# Patient Record
Sex: MALE | Employment: FULL TIME | URBAN - METROPOLITAN AREA
[De-identification: names, ages, dates, MRNs, and addresses within clinical notes are randomized per-mention and may not be internally consistent; named-entity substitution may affect disease eponyms.]

---

## 2018-12-28 ENCOUNTER — HOSPITAL ENCOUNTER (EMERGENCY)
Facility: CLINIC | Age: 33
Discharge: HOME | End: 2018-12-28
Attending: EMERGENCY MEDICINE | Admitting: EMERGENCY MEDICINE

## 2018-12-28 VITALS
RESPIRATION RATE: 12 BRPM | HEART RATE: 75 BPM | OXYGEN SATURATION: 99 % | TEMPERATURE: 97.9 F | SYSTOLIC BLOOD PRESSURE: 112 MMHG | DIASTOLIC BLOOD PRESSURE: 80 MMHG | WEIGHT: 158 LBS

## 2018-12-28 DIAGNOSIS — L03.031 PARONYCHIA OF TOE, RIGHT: ICD-10-CM

## 2018-12-28 PROCEDURE — 99284 EMERGENCY DEPT VISIT MOD MDM: CPT | Mod: Z6 | Performed by: EMERGENCY MEDICINE

## 2018-12-28 PROCEDURE — 99282 EMERGENCY DEPT VISIT SF MDM: CPT

## 2018-12-28 RX ORDER — CEPHALEXIN 500 MG/1
500 CAPSULE ORAL 4 TIMES DAILY
Qty: 40 CAPSULE | Refills: 0 | Status: SHIPPED | OUTPATIENT
Start: 2018-12-28 | End: 2019-01-07

## 2018-12-28 SDOH — HEALTH STABILITY: MENTAL HEALTH: HOW OFTEN DO YOU HAVE A DRINK CONTAINING ALCOHOL?: NEVER

## 2018-12-28 ASSESSMENT — ENCOUNTER SYMPTOMS: COLOR CHANGE: 1

## 2018-12-28 NOTE — ED AVS SNAPSHOT
[facility], [place], Emergency Department  [address]  [place] [address]  Phone:  [phone]  Fax:  [phone]                                    Ge Messina   MRN: [id number]    Department:  [facility], Emergency Department   Date of Visit:  12/28/2018           After Visit Summary Signature Page    I have received my discharge instructions, and my questions have been answered. I have discussed any challenges I see with this plan with the nurse or doctor.    ..........................................................................................................................................  Patient/Patient Representative Signature      ..........................................................................................................................................  Patient Representative Print Name and Relationship to Patient    ..................................................               ................................................  Date                                   Time    ..........................................................................................................................................  Reviewed by Signature/Title    ...................................................              ..............................................  Date                                               Time          22EPIC Rev 08/18

## 2018-12-29 NOTE — ED PROVIDER NOTES
[facility] EMERGENCY DEPARTMENT ([facility])    12/28/18       History     Chief Complaint   Patient presents with     Foot Pain     Pt states that he thinks he has an ingrown toe nail     The history is provided by the patient.     Ge Messina is a 33 year old otherwise healthy male who presents to the Emergency Department for evaluation of swelling, redness and pain on the right great toe that the patient first noticed today around 12 PM.  The patient reports that he first noticed it starting near the nail bed and it has been worsening throughout the day.  The patient denies any trauma to the feet.  He denies wearing tight shoes.  The patient denies any history of diabetes mellitus and he denies any history of past infections.  He denies any pain going into the foot.  He denies any allergies to antibiotics.    I have reviewed the Medications, Allergies, Past Medical and Surgical History, and Social History in the Epic system.    History reviewed. No pertinent past medical history.    History reviewed. No pertinent surgical history.    History reviewed. No pertinent family history.    Social History     Tobacco Use     Smoking status: Current Some Day Smoker     Smokeless tobacco: Never Used   Substance Use Topics     Alcohol use: No     Frequency: Never     Comment: sometimes       No current facility-administered medications for this encounter.      Current Outpatient Medications   Medication     cephALEXin (KEFLEX) 500 MG capsule      No Known Allergies      Review of Systems   Skin: Positive for color change (redness right great toe).        Positive for swelling of right great toe   All other systems reviewed and are negative.      Physical Exam   BP: 117/65  Pulse: 81  Temp: 98.5  F (36.9  C)  Resp: 16  Weight: 71.7 kg (158 lb)  SpO2: 98 %      Physical Exam   Constitutional: He is oriented to person, place, and time. He appears well-developed and well-nourished. No distress.    HENT:   Head: Normocephalic and atraumatic.   Neck: Normal range of motion.   Pulmonary/Chest: Effort normal.   Neurological: He is alert and oriented to person, place, and time.   Skin: He is not diaphoretic. There is erythema (base of great toenail.  no pus noted but appears to be an early paronychia. mild swelling.  no foreign body. ).   Nursing note and vitals reviewed.      ED Course   10:59 PM  The patient was seen and examined by Delmis Jordan MD in Room ED20.        Procedures           Labs Ordered and Resulted from Time of ED Arrival Up to the Time of Departure from the ED - No data to display         Assessments & Plan (with Medical Decision Making)   Appears to have an early paronychia.  No pus to drain. No hx of foreign body or trauma.  No hx of DM or immunosuppression.  No MRSA hx.  Will treat with keflex. We discussed returning if worsening.     I have reviewed the nursing notes.    I have reviewed the findings, diagnosis, plan and need for follow up with the patient.       Medication List      Started    cephALEXin 500 MG capsule  Commonly known as:  KEFLEX  500 mg, Oral, 4 TIMES DAILY            Final diagnoses:   Paronychia of toe, right     IMehdi, am serving as a trained medical scribe to document services personally performed by Delmis Jordan MD, based on the provider's statements to me.   IDelmis MD, was physically present and have reviewed and verified the accuracy of this note documented by Mehdi Bloom.    12/28/2018   [facility], EMERGENCY DEPARTMENT     Delmis Jordan MD  12/29/18 0131

## 2018-12-29 NOTE — DISCHARGE INSTRUCTIONS
Take keflex as prescribed.     If the redness is worsening, see your doctor or return for further evaluation.

## 2023-08-25 ENCOUNTER — HOSPITAL ENCOUNTER (EMERGENCY)
Facility: CLINIC | Age: 38
Discharge: HOME | End: 2023-08-25
Attending: EMERGENCY MEDICINE | Admitting: EMERGENCY MEDICINE

## 2023-08-25 VITALS
OXYGEN SATURATION: 97 % | RESPIRATION RATE: 16 BRPM | DIASTOLIC BLOOD PRESSURE: 77 MMHG | SYSTOLIC BLOOD PRESSURE: 130 MMHG | TEMPERATURE: 98.3 F | HEART RATE: 56 BPM

## 2023-08-25 DIAGNOSIS — S61.012A THUMB LACERATION, LEFT, INITIAL ENCOUNTER: ICD-10-CM

## 2023-08-25 PROCEDURE — 90715 TDAP VACCINE 7 YRS/> IM: CPT | Performed by: EMERGENCY MEDICINE

## 2023-08-25 PROCEDURE — 99283 EMERGENCY DEPT VISIT LOW MDM: CPT | Mod: 25 | Performed by: EMERGENCY MEDICINE

## 2023-08-25 PROCEDURE — 12001 RPR S/N/AX/GEN/TRNK 2.5CM/<: CPT | Performed by: EMERGENCY MEDICINE

## 2023-08-25 PROCEDURE — 250N000011 HC RX IP 250 OP 636: Performed by: EMERGENCY MEDICINE

## 2023-08-25 PROCEDURE — 250N000009 HC RX 250: Performed by: EMERGENCY MEDICINE

## 2023-08-25 PROCEDURE — 90471 IMMUNIZATION ADMIN: CPT | Performed by: EMERGENCY MEDICINE

## 2023-08-25 RX ORDER — LIDOCAINE HYDROCHLORIDE 10 MG/ML
10 INJECTION, SOLUTION INFILTRATION; PERINEURAL ONCE
Status: COMPLETED | OUTPATIENT
Start: 2023-08-25 | End: 2023-08-25

## 2023-08-25 RX ORDER — CEPHALEXIN 500 MG/1
500 CAPSULE ORAL 4 TIMES DAILY
Qty: 12 CAPSULE | Refills: 0 | Status: SHIPPED | OUTPATIENT
Start: 2023-08-25 | End: 2023-08-28

## 2023-08-25 RX ADMIN — LIDOCAINE HYDROCHLORIDE 10 ML: 10 INJECTION, SOLUTION INFILTRATION; PERINEURAL at 13:42

## 2023-08-25 RX ADMIN — CLOSTRIDIUM TETANI TOXOID ANTIGEN (FORMALDEHYDE INACTIVATED), CORYNEBACTERIUM DIPHTHERIAE TOXOID ANTIGEN (FORMALDEHYDE INACTIVATED), BORDETELLA PERTUSSIS TOXOID ANTIGEN (GLUTARALDEHYDE INACTIVATED), BORDETELLA PERTUSSIS FILAMENTOUS HEMAGGLUTININ ANTIGEN (FORMALDEHYDE INACTIVATED), BORDETELLA PERTUSSIS PERTACTIN ANTIGEN, AND BORDETELLA PERTUSSIS FIMBRIAE 2/3 ANTIGEN 0.5 ML: 5; 2; 2.5; 5; 3; 5 INJECTION, SUSPENSION INTRAMUSCULAR at 13:39

## 2023-08-25 ASSESSMENT — ACTIVITIES OF DAILY LIVING (ADL): ADLS_ACUITY_SCORE: 33

## 2023-08-25 NOTE — DISCHARGE INSTRUCTIONS
Was happy to see that your wound came together well.  We did place 6 interrupted sutures.  I would take your antibiotic over the next few days to minimize risk of infection.  The sutures can get wet but I would avoid prolonged soaking such as bathtubs, hot tubs, lakes or streams.  Monitor for signs of infection.  Should you notice any increase in swelling, redness, or drainage of pus please return sooner.  Otherwise sutures should be removed in approximately 8 to 10 days.  We are certainly happy to reevaluate you emergently should you have any change, progression or any worsening of symptoms.

## 2023-08-25 NOTE — ED TRIAGE NOTES
Triage Assessment       Row Name 08/25/23 6757       Triage Assessment (Adult)    Airway WDL WDL       Respiratory WDL    Respiratory WDL WDL       Cardiac WDL    Cardiac WDL WDL       Peripheral/Neurovascular WDL    Peripheral Neurovascular WDL WDL       Cognitive/Neuro/Behavioral WDL    Cognitive/Neuro/Behavioral WDL WDL

## 2023-08-25 NOTE — ED PROVIDER NOTES
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      \[facility] [place] [facility]\""  Ge Wong Messina is a 37 year old right-hand-dominant male arriving today to the emergency department evaluation of a laceration sustained to the finger pad of his left finger palmar aspect.  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  He was attempting to cut when a knife slipped sustaining a laceration over the pad of his left thumb.  He reports localized pain and bleeding controlled with direct pressure.  No distal change in strength or sensation.  No difficulty with range of motion.  Patient reports unknown tetanus status.  No other injury sustained.      Past Medical History  No past medical history on file.  No past surgical history on file.  No current outpatient medications on file.    No Known Allergies  Family History  No family history on file.  Social History   Social History     Tobacco Use    Smoking status: Some Days    Smokeless tobacco: Never   Substance Use Topics    Alcohol use: No     Comment: sometimes    Drug use: No         A medically appropriate review of systems was performed with pertinent positives and negatives noted in the HPI, and all other systems negative.      Physical Exam   BP: 130/77  Pulse: 56  Temp: 98.3  F (36.8  C)  Resp: 16  SpO2: 97 %      Physical Exam  Vitals and nursing note reviewed.   Constitutional:       General: He is not in acute distress.     Appearance: He is not ill-appearing or diaphoretic.   Cardiovascular:      Pulses: Normal pulses.   Musculoskeletal:      Comments: 2.5 cm chevron shaped laceration of the pad of the left first finger.  Mild active venous oozing.  No gross deformity.  Distal neurovascular examination normal.  Range of motion intact.  Flap under laceration somewhat dusky.   Neurological:      Mental Status: He is  alert.         ED Course        [facility]    -Laceration Repair    Date/Time: 8/25/2023 1:29 PM    Performed by: Maicol Rich MD  Authorized by: Maicol Rich MD    Risks, benefits and alternatives discussed.      ANESTHESIA (see MAR for exact dosages):     Anesthesia method:  Nerve block    Block needle gauge:  27 G    Block anesthetic:  Lidocaine 1% w/o epi    Block technique:  Digital nerve    Block injection procedure:  Anatomic landmarks identified, introduced needle, anatomic landmarks palpated and negative aspiration for blood    Block outcome:  Anesthesia achieved    LACERATION DETAILS     Location:  Finger    Finger location:  L thumb    Length (cm):  2.5    REPAIR TYPE:     Repair type:  Simple    EXPLORATION:     Wound extent: areolar tissue not violated, no foreign body, no signs of injury, no nerve damage and no tendon damage      Contaminated: no      TREATMENT:     Area cleansed with:  Saline    Amount of cleaning:  Extensive    Irrigation solution:  Sterile saline    Irrigation volume:  250    Irrigation method:  Syringe    Visualized foreign bodies/material removed: no      SKIN REPAIR     Repair method:  Sutures    Suture size:  4-0    Suture material:  Nylon    Suture technique:  Simple interrupted    Number of sutures:  6    APPROXIMATION     Approximation:  Close    POST-PROCEDURE DETAILS     Dressing:  Antibiotic ointment and adhesive bandage      PROCEDURE    Patient Tolerance:  Patient tolerated the procedure well with no immediate complications           No results found for this or any previous visit (from the past 24 hour(s)).  Medications - No data to display          Critical care was not performed.     Medical Decision Making  The patient's presentation was of low complexity (an acute and uncomplicated illness or injury).    The patient's evaluation involved:  history and exam without other MDM data elements    The  patient's management necessitated only low risk treatment.    Assessments & Plan (with Medical Decision Making)     Ge Messina is a 37 year old right-hand-dominant male arriving today to the emergency department evaluation of a laceration sustained to the finger pad of his left finger palmar aspect.  On arrival patient to be alert, afebrile, hematin stable.  He seated upright in a chair and appears to be nontoxic.  External evaluation demonstrate an approximate 2.5 cm chevron shaped laceration over the pad of the left first finger extending towards the lateral aspect of the nail.  Minimal active venous oozing.  Distal neurovascular examination intact.  Low suspicion for laceration to the neurovascular bundle.  Range of motion intact.  I do not appreciate signs of flexor tendon laceration.  Digital nerve block applied and wound thoroughly irrigated.  Closed primarily with interrupted sutures.  We have discussed wound management, indications for close outpatient follow-up versus emergent return.    I have reviewed the nursing notes.    I have reviewed the findings, diagnosis, plan and need for follow up with the patient.    New Prescriptions    No medications on file       Final diagnoses:   Thumb laceration, left, initial encounter       MAICOL OLMSTEAD MD    8/25/2023   [facility] EMERGENCY DEPARTMENT     Maicol Olmstead MD  08/25/23 [id number]

## 2023-08-25 NOTE — ED TRIAGE NOTES
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 Patient reports small laceration on the left pointer finger and a larger laceration on the left thumb which bleeding is now under control. Patient reports it happened an hour before coming in.

## 2023-09-01 ENCOUNTER — HOSPITAL ENCOUNTER (EMERGENCY)
Facility: CLINIC | Age: 38
Discharge: HOME | End: 2023-09-01
Admitting: EMERGENCY MEDICINE

## 2023-09-01 VITALS
TEMPERATURE: 98.2 F | DIASTOLIC BLOOD PRESSURE: 77 MMHG | HEIGHT: 66 IN | HEART RATE: 51 BPM | RESPIRATION RATE: 16 BRPM | BODY MASS INDEX: 25.23 KG/M2 | SYSTOLIC BLOOD PRESSURE: 113 MMHG | OXYGEN SATURATION: 100 % | WEIGHT: 157 LBS

## 2023-09-01 PROCEDURE — 99281 EMR DPT VST MAYX REQ PHY/QHP: CPT

## 2023-09-01 ASSESSMENT — ACTIVITIES OF DAILY LIVING (ADL): ADLS_ACUITY_SCORE: 33

## 2023-09-01 NOTE — ED NOTES
Nurse only visit. Pt presented with stitches to the left thumb that were placed 8 days ago. Sutures cleaned with iodine and removed by this RN, slight bleeding from suture puncture holes. No signs of infection.

## 2023-09-01 NOTE — ED TRIAGE NOTES
"Patient states, \"I think I need my stiches removed, it has been 8 days.\" Bandage noted on left thumb. Denies pain or any complications.      Triage Assessment       Row Name 09/01/23 1252       Triage Assessment (Adult)    Airway WDL WDL       Respiratory WDL    Respiratory WDL WDL       Skin Circulation/Temperature WDL    Skin Circulation/Temperature WDL X       Cardiac WDL    Cardiac WDL WDL       Peripheral/Neurovascular WDL    Peripheral Neurovascular WDL WDL       Cognitive/Neuro/Behavioral WDL    Cognitive/Neuro/Behavioral WDL WDL                    "

## 2023-09-01 NOTE — DISCHARGE INSTRUCTIONS
Keep laceration clean and dry. Monitor for redness, warmth, and drainage from the laceration site. Please return to the ED to be evaluated if any signs of infection.

## 2025-05-05 ENCOUNTER — HOSPITAL ENCOUNTER (EMERGENCY)
Facility: CLINIC | Age: 40
Discharge: HOME | End: 2025-05-05
Attending: STUDENT IN AN ORGANIZED HEALTH CARE EDUCATION/TRAINING PROGRAM

## 2025-05-05 VITALS
WEIGHT: 142 LBS | RESPIRATION RATE: 18 BRPM | OXYGEN SATURATION: 99 % | TEMPERATURE: 98.1 F | HEIGHT: 66 IN | DIASTOLIC BLOOD PRESSURE: 76 MMHG | SYSTOLIC BLOOD PRESSURE: 136 MMHG | BODY MASS INDEX: 22.82 KG/M2 | HEART RATE: 60 BPM

## 2025-05-05 DIAGNOSIS — M54.41 ACUTE RIGHT-SIDED LOW BACK PAIN WITH RIGHT-SIDED SCIATICA: ICD-10-CM

## 2025-05-05 PROCEDURE — 99284 EMERGENCY DEPT VISIT MOD MDM: CPT | Mod: FS | Performed by: STUDENT IN AN ORGANIZED HEALTH CARE EDUCATION/TRAINING PROGRAM

## 2025-05-05 PROCEDURE — 250N000013 HC RX MED GY IP 250 OP 250 PS 637: Performed by: STUDENT IN AN ORGANIZED HEALTH CARE EDUCATION/TRAINING PROGRAM

## 2025-05-05 PROCEDURE — 99283 EMERGENCY DEPT VISIT LOW MDM: CPT | Performed by: STUDENT IN AN ORGANIZED HEALTH CARE EDUCATION/TRAINING PROGRAM

## 2025-05-05 RX ORDER — LIDOCAINE 4 G/G
1 PATCH TOPICAL ONCE
Status: DISCONTINUED | OUTPATIENT
Start: 2025-05-05 | End: 2025-05-05 | Stop reason: HOSPADM

## 2025-05-05 RX ORDER — METHYLPREDNISOLONE 4 MG/1
TABLET ORAL
Qty: 21 TABLET | Refills: 0 | Status: SHIPPED | OUTPATIENT
Start: 2025-05-05

## 2025-05-05 RX ADMIN — LIDOCAINE 4% 1 PATCH: 40 PATCH TOPICAL at 18:43

## 2025-05-05 ASSESSMENT — COLUMBIA-SUICIDE SEVERITY RATING SCALE - C-SSRS
1. IN THE PAST MONTH, HAVE YOU WISHED YOU WERE DEAD OR WISHED YOU COULD GO TO SLEEP AND NOT WAKE UP?: NO
2. HAVE YOU ACTUALLY HAD ANY THOUGHTS OF KILLING YOURSELF IN THE PAST MONTH?: NO
6. HAVE YOU EVER DONE ANYTHING, STARTED TO DO ANYTHING, OR PREPARED TO DO ANYTHING TO END YOUR LIFE?: NO

## 2025-05-05 NOTE — ED TRIAGE NOTES
Patient reports right leg pain. Patient reports he was playing volleyball two weeks ago and feels like he injured his leg at that time. Patient also works installing carpets and is thinking he could have injured it at that time too. Patient reports most of his pain in underneath his right knee and radiates up towards the hip. Patient denies numbness or tingling in the hands or feet.

## 2025-05-05 NOTE — DISCHARGE INSTRUCTIONS
You were seen in the emergency room today for evaluation of leg pain. This seems consistent with sciatic nerve pain -- the nerve runs along the back of your leg and is likely getting pinched in your low back. You may use heat to the area which can help as well as Tylenol or ibuprofen. I have also provided a prescription for a steroid pack that can help with your pain as well.     Someone from scheduling should call you in the next few days to help set up physical therapy but I would also like you to follow-up with your primary care provider in the next week or so. Please return to the emergency room for any new or worsening symptoms including worsening back pain, new numbness, tingling, or weakness, or loss of bowel or bladder control.

## 2025-05-05 NOTE — ED PROVIDER NOTES
"ED Provider Note  [facility]      History     Chief Complaint   Patient presents with    Leg Pain     Right leg pain - starts behind the knee and radiates up to the hip. Has been hurting for two weeks.      The history is provided by the patient and medical records. No  was used.     Ge Messina is a 39 year old male who presents to the emergency department for evaluation of leg pain. He reports history of right-sided sciatic pain in January that went away with massage although returned in the last two weeks. 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 He denies any new numbness, tingling, or weakness, recent trauma, loss of bowel or bladder control, saddle anesthesia, fevers, or other concerning symptoms. He is otherwise doing well. Has tried Tylenol and ibuprofen for his pain with minimal relief.     Past Medical History  No past medical history on file.  No past surgical history on file.  methylPREDNISolone (MEDROL DOSEPAK) 4 MG tablet therapy pack      No Known Allergies  Family History  No family history on file.  Social History   Social History     Tobacco Use    Smoking status: Some Days    Smokeless tobacco: Never   Substance Use Topics    Alcohol use: No     Comment: sometimes    Drug use: No      A medically appropriate review of systems was performed with pertinent positives and negatives noted in the HPI, and all other systems negative.    Physical Exam   BP: 136/76  Pulse: 60  Temp: 98.1  F (36.7  C)  Resp: 18  Height: 167.6 cm (5' 6\")  Weight: 64.4 kg (142 lb)  SpO2: 99 %  Physical Exam  Vitals and nursing note reviewed.   Constitutional:       General: He is not in acute distress.     Appearance: Normal appearance. He is not ill-appearing, toxic-appearing or " diaphoretic.      Comments: Awake, alert, sitting on edge of bed although stands up and prefers standing while talking   HENT:      Head: Normocephalic and atraumatic.   Eyes:      Extraocular Movements: Extraocular movements intact.      Pupils: Pupils are equal, round, and reactive to light.   Cardiovascular:      Rate and Rhythm: Normal rate and regular rhythm.   Pulmonary:      Effort: Pulmonary effort is normal. No respiratory distress.      Breath sounds: Normal breath sounds. No stridor. No wheezing, rhonchi or rales.   Abdominal:      General: Abdomen is flat.      Palpations: Abdomen is soft.      Tenderness: There is no abdominal tenderness. There is no right CVA tenderness, left CVA tenderness, guarding or rebound.   Musculoskeletal:      Cervical back: Normal range of motion and neck supple. No rigidity or tenderness.      Comments: No midline cervical, thoracic, or lumbar spinal tenderness, no paraspinal tenderness, strength and sensation intact to bilateral lower extremities, increase in pain with straight leg raise   Lymphadenopathy:      Cervical: No cervical adenopathy.   Skin:     General: Skin is warm and dry.      Capillary Refill: Capillary refill takes less than 2 seconds.   Neurological:      General: No focal deficit present.      Mental Status: He is alert and oriented to person, place, and time.      Sensory: No sensory deficit.      Gait: Gait normal.   Psychiatric:         Mood and Affect: Mood normal.         Behavior: Behavior normal.         Thought Content: Thought content normal.         Judgment: Judgment normal.         ED Course, Procedures, & Data      Procedures          No results found for any visits on 05/05/25.  Medications - No data to display  Labs Ordered and Resulted from Time of ED Arrival to Time of ED Departure - No data to display  No orders to display          Critical care was not performed.     Medical Decision Making  The patient's presentation was of moderate  complexity (a chronic illness mild to moderate exacerbation, progression, or side effect of treatment).    The patient's evaluation involved:  review of external note(s) from 3+ sources (clinical notes)    The patient's management necessitated moderate risk (prescription drug management including medications given in the ED).    Assessment & Plan    Ge Messina is a 39 year old male who presents to the emergency department for evaluation of leg pain. He reports history of right-sided sciatica a few months ago that resolved and then returned about two weeks ago, unsure if this was exacerbated by volleyball or laying carpets at work. Denies any concerning back pain symptoms including numbness, tingling, or weakness, recent trauma, loss of bowel or bladder control, saddle anesthesia, fevers. On evaluation, he is normotensive, HR 60, afebrile, normoxic. On exam, he has no midline cervical, thoracic, or lumbar spinal tenderness, no paraspinal tenderness, strength and sensation are intact to bilateral lower extremities. He is breathing comfortably on room air, no respiratory distress, no adventitious lung sounds, normal heart rate, regular rhythm. Feel that pain is consistent with his previous sciatic nerve pain and there were no red flag signs or symptoms warranting emergent imaging. He was provided with a lidocaine patch and prescribed a medrol dosepak, referral for physical therapy also provided. Advised also to follow-up with primary care for re-evaluation in the next week and to return to the emergency department with any new or worsening symptoms as discussed. He was in understanding and agreement with plan and had no additional questions or concerns. He was discharged home in stable condition.     I have reviewed the nursing notes. I have reviewed the findings, diagnosis, plan and need for follow up with the patient.    Discharge Medication List as of 5/5/2025  6:50 PM        START taking these  medications    Details   methylPREDNISolone (MEDROL DOSEPAK) 4 MG tablet therapy pack Follow Package Directions, Disp-21 tablet, R-0, Local Print             Final diagnoses:   Acute right-sided low back pain with right-sided sciatica       Shazia Dumont PA-C   [facility] EMERGENCY DEPARTMENT  5/5/2025     Shazia Dumont PA-C  05/06/25 2009